# Patient Record
Sex: MALE | Race: OTHER | Employment: PART TIME | URBAN - METROPOLITAN AREA
[De-identification: names, ages, dates, MRNs, and addresses within clinical notes are randomized per-mention and may not be internally consistent; named-entity substitution may affect disease eponyms.]

---

## 2017-05-09 ENCOUNTER — OFFICE VISIT (OUTPATIENT)
Dept: FAMILY MEDICINE CLINIC | Facility: CLINIC | Age: 52
End: 2017-05-09

## 2017-05-09 ENCOUNTER — HOSPITAL ENCOUNTER (OUTPATIENT)
Dept: GENERAL RADIOLOGY | Age: 52
Discharge: HOME | End: 2017-05-09
Attending: PHYSICIAN ASSISTANT
Payer: COMMERCIAL

## 2017-05-09 VITALS
HEART RATE: 80 BPM | WEIGHT: 170 LBS | TEMPERATURE: 99 F | SYSTOLIC BLOOD PRESSURE: 120 MMHG | OXYGEN SATURATION: 98 % | DIASTOLIC BLOOD PRESSURE: 70 MMHG | RESPIRATION RATE: 18 BRPM

## 2017-05-09 DIAGNOSIS — R07.81 RIB PAIN ON RIGHT SIDE: ICD-10-CM

## 2017-05-09 DIAGNOSIS — R07.81 RIB PAIN ON RIGHT SIDE: Primary | ICD-10-CM

## 2017-05-09 PROCEDURE — 71101 X-RAY EXAM UNILAT RIBS/CHEST: CPT | Performed by: PHYSICIAN ASSISTANT

## 2017-05-09 PROCEDURE — 99203 OFFICE O/P NEW LOW 30 MIN: CPT | Performed by: PHYSICIAN ASSISTANT

## 2017-05-09 NOTE — PROGRESS NOTES
CHIEF COMPLAINT:     Patient presents with:   Injury: pt c\o of rib injury after a fall x 1wk ago     HPI:   Hiwot Maddox is a 46year old male who presents with complaints of right sided rib pain after falling into a pipe while trying to jump over it PCP when you return home. ER for acute onset of fever, SOB, cough, or worsening pain. The patient indicates understanding of these issues and agrees to the plan.

## (undated) NOTE — MR AVS SNAPSHOT
[address]  [address] [facility] [address] [phone]               Thank you for choosing us for your health care visit with Ariel Vargas PA-C.   We are glad to serve you and happy to provide you with this quesada Tips for making healthy food choices  -   Enjoy your food, but eat less. Fully enjoy your food when eating. Don’t eat while distracted and slow down. Avoid over sized portions. Don’t eat while when you’re bored.      EAT THESE FOODS MORE OFTEN: EAT T